# Patient Record
Sex: FEMALE | Race: WHITE | URBAN - METROPOLITAN AREA
[De-identification: names, ages, dates, MRNs, and addresses within clinical notes are randomized per-mention and may not be internally consistent; named-entity substitution may affect disease eponyms.]

---

## 2017-08-30 ENCOUNTER — TRANSFERRED RECORDS (OUTPATIENT)
Dept: HEALTH INFORMATION MANAGEMENT | Facility: CLINIC | Age: 62
End: 2017-08-30

## 2017-09-11 ENCOUNTER — TRANSFERRED RECORDS (OUTPATIENT)
Dept: HEALTH INFORMATION MANAGEMENT | Facility: CLINIC | Age: 62
End: 2017-09-11

## 2017-11-24 ENCOUNTER — PRE VISIT (OUTPATIENT)
Dept: ORTHOPEDICS | Facility: CLINIC | Age: 62
End: 2017-11-24

## 2017-11-24 DIAGNOSIS — M25.552 HIP PAIN, LEFT: Primary | ICD-10-CM

## 2017-11-24 NOTE — TELEPHONE ENCOUNTER
Discussed with patient reason for visit Left hip pain  Patient prepared for appointment through the followin. Have you had any recent xrays in the last 6 months? No, she will come early for XR.     2. Have you had an MRI? Yes - Confirmed in EPIC.    3. Have you had any surgery in past related to complaint?  No.  If yes, Patient advised that implant stickers are needed for any previous total joint surgery as well for appointment.     4. Are you being referred by another provider? Yes: Dr. David Alonso.      5. Have you received the intake form in mail?.Yes.    6. Is this work comp or MVA related? No.   Rhiannon Alejandro RN

## 2017-11-28 ENCOUNTER — RADIANT APPOINTMENT (OUTPATIENT)
Dept: GENERAL RADIOLOGY | Facility: CLINIC | Age: 62
End: 2017-11-28
Attending: ORTHOPAEDIC SURGERY
Payer: MEDICAID

## 2017-11-28 ENCOUNTER — OFFICE VISIT (OUTPATIENT)
Dept: ORTHOPEDICS | Facility: CLINIC | Age: 62
End: 2017-11-28
Payer: MEDICAID

## 2017-11-28 VITALS
SYSTOLIC BLOOD PRESSURE: 156 MMHG | HEART RATE: 102 BPM | WEIGHT: 293 LBS | DIASTOLIC BLOOD PRESSURE: 91 MMHG | BODY MASS INDEX: 47.09 KG/M2 | HEIGHT: 66 IN | TEMPERATURE: 97.2 F

## 2017-11-28 DIAGNOSIS — M25.552 HIP PAIN, LEFT: ICD-10-CM

## 2017-11-28 DIAGNOSIS — G89.29 CHRONIC LEFT-SIDED LOW BACK PAIN WITH LEFT-SIDED SCIATICA: Primary | ICD-10-CM

## 2017-11-28 DIAGNOSIS — M54.42 CHRONIC LEFT-SIDED LOW BACK PAIN WITH LEFT-SIDED SCIATICA: Primary | ICD-10-CM

## 2017-11-28 PROCEDURE — 99204 OFFICE O/P NEW MOD 45 MIN: CPT | Performed by: ORTHOPAEDIC SURGERY

## 2017-11-28 PROCEDURE — 73502 X-RAY EXAM HIP UNI 2-3 VIEWS: CPT | Performed by: RADIOLOGY

## 2017-11-28 ASSESSMENT — PAIN SCALES - GENERAL: PAINLEVEL: WORST PAIN (10)

## 2017-11-28 NOTE — LETTER
11/28/2017      RE: Lilliana Jordan  [address]  [facility] [id number]     Dear Colleague,    Thank you for referring your patient, Lilliana Jordan, to the [facility]. Please see a copy of my visit note below.    Chief Complaint: Consult (left hip pain)      Physician:  David Alonso    HPI: Lilliana Jordan is a 62 year old female who presents today for evaluation of her left hip. Symptoms are frequently radicular in nature and go below the knee. She had an intra-articular steroid injection into her hip and this did not change her symptoms including in the first few hours.She has had HENNY and reports that she has good but temporary relief from these.     Current Status:  Results of the patient s Hip Disability and Osteoarthritis Outcome Score (HOOS)  are as follows (0-100 scales with 100 being the theoretical best):  Pain: 20  Symptoms: 0  ADLs: 29  Sports/Recreation: 100  Quality of Life:0  (http://koos.nu/)  [facility] Activity Score: 2    MEDICAL HISTORY: No past medical history on file.   Reviewed on the patient's intake form.     Pertinent negatives:  Patient has no history of DVT or PE. Discussed risk factors.    Medications:     Current Outpatient Prescriptions:      acetaminophen 500 MG CAPS, Take 1-2 tablets by mouth as needed., Disp: , Rfl:      albuterol (ACCUNEB) 1.25 MG/3ML nebulizer solution, Take 1 ampule by nebulization every 6 hours as needed., Disp: , Rfl:      allopurinol (ZYLOPRIM) 100 MG tablet, Take 100 mg by mouth daily., Disp: , Rfl:      ALPRAZolam (XANAX) 0.25 MG tablet, Take 0.25 mg by mouth 3 times daily as needed., Disp: , Rfl:      bisacodyl (DULCOLAX) 10 MG suppository, Place 10 mg rectally daily as needed., Disp: , Rfl:      budesonide (PULMICORT) 1 MG/2ML SUSP nebulizer solution, Take 1 mg by nebulization., Disp: , Rfl:      Buprenorphine (BUTRANS) 20 MCG/HR PTWK, Place  onto the skin., Disp: , Rfl:      busPIRone (BUSPAR) 5 MG  tablet, Take 5 mg by mouth 2 times daily., Disp: , Rfl:      clobetasol (TEMOVATE) 0.05 % external solution, Apply  topically 2 times daily., Disp: , Rfl:      Guaifenesin-Codeine (ROBITUSSIN A-C OR), Take  by mouth., Disp: , Rfl:      escitalopram (LEXAPRO) 20 MG tablet, Take 20 mg by mouth daily., Disp: , Rfl:      Exenatide (BYETTA 10 MCG PEN) 10 MCG/0.04ML SOLN, Inject 10 mcg Subcutaneous 2 times daily (before meals)., Disp: , Rfl:      gabapentin (NEURONTIN) 600 MG tablet, Take 600 mg by mouth 3 times daily., Disp: , Rfl:      HYDROcodone-acetaminophen 5-325 MG per tablet, Take 1 tablet by mouth every 6 hours as needed., Disp: , Rfl:      NovoLOG VIAL 100 UNITS/ML SC SOLN, Inject  Subcutaneous. As directed, Disp: , Rfl:      LANTUS VIAL 100 UNITS/ML SC SOLN, Inject 40 Units Subcutaneous 2 times daily., Disp: , Rfl:      ipratropium - albuterol 0.5 mg/2.5 mg/3 mL (DUONEB) 0.5-2.5 (3) MG/3ML nebulization, Take 1 ampule by nebulization every 6 hours as needed., Disp: , Rfl:      lidocaine (LIDODERM) 5 % patch, Place 1 patch onto the skin every 24 hours., Disp: , Rfl:      metFORMIN (GLUMETZA) 1000 MG (MOD) 24 hr tablet, Take 1,000 mg by mouth 2 times daily., Disp: , Rfl:      miconazole (ZEASORB-AF) 2 % powder, Apply  topically as needed., Disp: , Rfl:      montelukast (SINGULAIR) 10 MG tablet, Take 10 mg by mouth At Bedtime., Disp: , Rfl:      omeprazole (PRILOSEC) 20 MG capsule, Take 1 capsule by mouth 2 times daily., Disp: , Rfl:      oxybutynin (DITROPAN) 5 MG tablet, Take 1 tablet by mouth 2 times daily., Disp: , Rfl:      QUEtiapine (SEROQUEL XR) 200 MG 24 hr tablet, Take 200 mg by mouth At Bedtime., Disp: , Rfl:      rizatriptan (MAXALT) 10 MG tablet, Take 1 tablet by mouth at onset of headache., Disp: , Rfl:      ROPINIROLE HYDROCHLORIDE 4 MG TABS, Take 1 tablet by mouth At Bedtime., Disp: , Rfl:      valsartan (DIOVAN) 80 MG tablet, Take 1 tablet by mouth daily., Disp: , Rfl:     Allergies: Droperidol;  "Zofran [ondansetron hcl-dextrose]; Bactrim [sulfamethoxazole w-trimethoprim]; and Clindamycin hcl    SURGICAL HISTORY: No past surgical history on file.    FAMILY HISTORY: No family history on file.    SOCIAL HISTORY:   Social History   Substance Use Topics     Smoking status: Never Smoker     Smokeless tobacco: Not on file     Alcohol use No       REVIEW OF SYSTEMS:  The comprehensive review of systems from the intake form was reviewed with the patient.  No fever, weight change or fatigue. No dry eyes. No oral ulcers, sore throat or voice change. No palpitations, syncope, angina or edema.  No chest pain, excessive sleepiness, shortness of breath or hemoptysis.   No abdominal pain, nausea, vomiting, diarrhea or heartburn.  No skin rash. No focal weakness or numbness. No bleeding or lymphadenopathy. No rhinitis or hives.     Exam:  On physical examination the patient appears the stated age, is in no acute distress, affectThe is appropriate, and breathing is non-labored.  Vitals are documented in the EMR and have been reviewed:    BP (!) 156/91 (Patient Position: Sitting, Cuff Size: Adult Large)  Pulse 102  Temp 97.2  F (36.2  C)  Ht 1.67 m (5' 5.75\")  Wt (!) 176.9 kg (390 lb)  BMI 63.43 kg/m2  5' 5.75\"  Body mass index is 63.43 kg/(m^2).    Rises from chair:easily   Gait:bilateral trendelenburg  Lateral hip symptoms with palpation at the greater troch   Distally, the circulatory, motor, and sensation exam is intact with 5/5 EHL, gastroc-soleus, and tibialis anterior.  Sensation to light touch is intact.  Dorsalis pedis and posterior tibialis pulses are palpable.  There are no sores on the feet, no bruising, and no lymphedema.    X-rays:   Normal joint space for age, symmetric hips.   Unremarkable MR findings with minimal degenerative changes.     Assessment and Plan: This is a 62 year old with symptoms, imaging, and physical examination that are not consistent with a hip problem. I have suggested that it would " be reasonable to have her back re-evaluated especially given the fact that HENNY lumbar spine have been the only intervention that has changed her symptoms.     Plan:        Again, thank you for allowing me to participate in the care of your patient.      Sincerely,    Gavin Aiken MD

## 2017-11-28 NOTE — MR AVS SNAPSHOT
After Visit Summary   2017    Lilliana Jordan    MRN: [id number]           Patient Information     Date Of Birth          1955        Visit Information        Provider Department      2017 11:30 AM Gavin Aiken MD [facility]        Today's Diagnoses     Chronic left-sided low back pain with left-sided sciatica    -  1       Follow-ups after your visit        Who to contact     If you have questions or need follow up information about today's clinic visit or your schedule please contact [facility] directly at [phone].  Normal or non-critical lab and imaging results will be communicated to you by MyChart, letter or phone within 4 business days after the clinic has received the results. If you do not hear from us within 7 days, please contact the clinic through [employer] or phone. If you have a critical or abnormal lab result, we will notify you by phone as soon as possible.  Submit refill requests through [employer] or call your pharmacy and they will forward the refill request to us. Please allow 3 business days for your refill to be completed.          Additional Information About Your Visit        MyChart Information     [employer] is an electronic gateway that provides easy, online access to your medical records. With [employer], you can request a clinic appointment, read your test results, renew a prescription or communicate with your care team.     To sign up for [employer] visit the website at [email or web address]   You will be asked to enter the access code listed below, as well as some personal information. Please follow the directions to create your username and password.     Your access code is: [email or web address]  Expires: 2018  1:27 PM     Your access code will  in 90 days. If you need help or a new code, please contact your [facility] or call [phone] for assistance.        Care  "EveryWhere ID     This is your Care EveryWhere ID. This could be used by other organizations to access your [place] medical records  [id number]        Your Vitals Were     Pulse Temperature Height BMI (Body Mass Index)          102 97.2  F (36.2  C) 1.67 m (5' 5.75\") 63.43 kg/m2         Blood Pressure from Last 3 Encounters:   11/28/17 (!) 156/91    Weight from Last 3 Encounters:   11/28/17 (!) 176.9 kg (390 lb)   07/30/12 (!) 171.5 kg (378 lb)              Today, you had the following     No orders found for display       Primary Care Provider Office Phone # Fax #    Nehemias Walsh [phone]       [facility] [address]  [facility] [address]        Equal Access to Services     JORGE LEWIS : Hadii aad ku hadasho Sodiamond, waaxda luqadaha, qaybta kaalmada adefrank, debi martin . [facility] [phone].    ATENCIÓN: Si habla español, tiene a nguyen disposición servicios gratuitos de asistencia lingüística. Monica al [phone].    We comply with applicable federal civil rights laws and [place] laws. We do not discriminate on the basis of race, color, national origin, age, disability, sex, sexual orientation, or gender identity.            Thank you!     Thank you for choosing [facility]  for your care. Our goal is always to provide you with excellent care. Hearing back from our patients is one way we can continue to improve our services. Please take a few minutes to complete the written survey that you may receive in the mail after your visit with us. Thank you!             Your Updated Medication List - Protect others around you: Learn how to safely use, store and throw away your medicines at www.disposemymeds.org.          This list is accurate as of: 11/28/17  1:27 PM.  Always use your most recent med list.                   Brand Name Dispense Instructions for use Diagnosis    acetaminophen 500 MG Caps      Take " 1-2 tablets by mouth as needed.        albuterol 1.25 MG/3ML nebulizer solution    ACCUNEB     Take 1 ampule by nebulization every 6 hours as needed.        allopurinol 100 MG tablet    ZYLOPRIM     Take 100 mg by mouth daily.        BUSPAR 5 MG tablet   Generic drug:  busPIRone      Take 5 mg by mouth 2 times daily.        BUTRANS 20 MCG/HR WK patch   Generic drug:  buprenorphine      Place  onto the skin.        BYETTA 10 MCG PEN 10 MCG/0.04ML Soln pen (contains 2.4 ml = 60 doses)   Generic drug:  exenatide      Inject 10 mcg Subcutaneous 2 times daily (before meals).        DIOVAN 80 MG tablet   Generic drug:  valsartan      Take 1 tablet by mouth daily.        DULCOLAX 10 MG Suppository   Generic drug:  bisacodyl      Place 10 mg rectally daily as needed.        gabapentin 600 MG tablet    NEURONTIN     Take 600 mg by mouth 3 times daily.        HYDROcodone-acetaminophen 5-325 MG per tablet    NORCO     Take 1 tablet by mouth every 6 hours as needed.        ipratropium - albuterol 0.5 mg/2.5 mg/3 mL 0.5-2.5 (3) MG/3ML neb solution    DUONEB     Take 1 ampule by nebulization every 6 hours as needed.        LANTUS VIAL 100 UNIT/ML injection   Generic drug:  insulin glargine      Inject 40 Units Subcutaneous 2 times daily.        LEXAPRO 20 MG tablet   Generic drug:  escitalopram      Take 20 mg by mouth daily.        LIDODERM 5 % Patch   Generic drug:  lidocaine      Place 1 patch onto the skin every 24 hours.        MAXALT 10 MG tablet   Generic drug:  rizatriptan      Take 1 tablet by mouth at onset of headache.        metFORMIN modified 1000 MG 24 hr tablet    GLUMETZA     Take 1,000 mg by mouth 2 times daily.        NovoLOG VIAL 100 UNITS/ML injection   Generic drug:  insulin aspart      Inject  Subcutaneous. As directed        oxybutynin 5 MG tablet    DITROPAN     Take 1 tablet by mouth 2 times daily.        priLOSEC 20 MG CR capsule   Generic drug:  omeprazole      Take 1 capsule by mouth 2 times daily.         PULMICORT 1 MG/2ML Susp neb solution   Generic drug:  budesonide      Take 1 mg by nebulization.        ROBITUSSIN A-C OR      Take  by mouth.        ROPINIROLE HYDROCHLORIDE 4 MG Tabs      Take 1 tablet by mouth At Bedtime.        SEROQUEL  MG 24 hr tablet   Generic drug:  QUEtiapine      Take 200 mg by mouth At Bedtime.        SINGULAIR 10 MG tablet   Generic drug:  montelukast      Take 10 mg by mouth At Bedtime.        TEMOVATE 0.05 % external solution   Generic drug:  clobetasol      Apply  topically 2 times daily.        XANAX 0.25 MG tablet   Generic drug:  ALPRAZolam      Take 0.25 mg by mouth 3 times daily as needed.        ZEASORB-AF 2 % powder   Generic drug:  miconazole      Apply  topically as needed.

## 2017-11-28 NOTE — PROGRESS NOTES
Chief Complaint: Consult (left hip pain)      Physician:  David Alonso    HPI: Lilliana Jordan is a 62 year old female who presents today for evaluation of her left hip. Symptoms are frequently radicular in nature and go below the knee. She had an intra-articular steroid injection into her hip and this did not change her symptoms including in the first few hours.She has had HENNY and reports that she has good but temporary relief from these.     Current Status:  Results of the patient s Hip Disability and Osteoarthritis Outcome Score (HOOS)  are as follows (0-100 scales with 100 being the theoretical best):  Pain: 20  Symptoms: 0  ADLs: 29  Sports/Recreation: 100  Quality of Life:0  (http://koos.nu/)  [facility] Activity Score: 2    MEDICAL HISTORY: No past medical history on file.   Reviewed on the patient's intake form.     Pertinent negatives:  Patient has no history of DVT or PE. Discussed risk factors.    Medications:     Current Outpatient Prescriptions:      acetaminophen 500 MG CAPS, Take 1-2 tablets by mouth as needed., Disp: , Rfl:      albuterol (ACCUNEB) 1.25 MG/3ML nebulizer solution, Take 1 ampule by nebulization every 6 hours as needed., Disp: , Rfl:      allopurinol (ZYLOPRIM) 100 MG tablet, Take 100 mg by mouth daily., Disp: , Rfl:      ALPRAZolam (XANAX) 0.25 MG tablet, Take 0.25 mg by mouth 3 times daily as needed., Disp: , Rfl:      bisacodyl (DULCOLAX) 10 MG suppository, Place 10 mg rectally daily as needed., Disp: , Rfl:      budesonide (PULMICORT) 1 MG/2ML SUSP nebulizer solution, Take 1 mg by nebulization., Disp: , Rfl:      Buprenorphine (BUTRANS) 20 MCG/HR PTWK, Place  onto the skin., Disp: , Rfl:      busPIRone (BUSPAR) 5 MG tablet, Take 5 mg by mouth 2 times daily., Disp: , Rfl:      clobetasol (TEMOVATE) 0.05 % external solution, Apply  topically 2 times daily., Disp: , Rfl:      Guaifenesin-Codeine (ROBITUSSIN A-C OR), Take  by mouth., Disp: , Rfl:      escitalopram (LEXAPRO) 20 MG  tablet, Take 20 mg by mouth daily., Disp: , Rfl:      Exenatide (BYETTA 10 MCG PEN) 10 MCG/0.04ML SOLN, Inject 10 mcg Subcutaneous 2 times daily (before meals)., Disp: , Rfl:      gabapentin (NEURONTIN) 600 MG tablet, Take 600 mg by mouth 3 times daily., Disp: , Rfl:      HYDROcodone-acetaminophen 5-325 MG per tablet, Take 1 tablet by mouth every 6 hours as needed., Disp: , Rfl:      NovoLOG VIAL 100 UNITS/ML SC SOLN, Inject  Subcutaneous. As directed, Disp: , Rfl:      LANTUS VIAL 100 UNITS/ML SC SOLN, Inject 40 Units Subcutaneous 2 times daily., Disp: , Rfl:      ipratropium - albuterol 0.5 mg/2.5 mg/3 mL (DUONEB) 0.5-2.5 (3) MG/3ML nebulization, Take 1 ampule by nebulization every 6 hours as needed., Disp: , Rfl:      lidocaine (LIDODERM) 5 % patch, Place 1 patch onto the skin every 24 hours., Disp: , Rfl:      metFORMIN (GLUMETZA) 1000 MG (MOD) 24 hr tablet, Take 1,000 mg by mouth 2 times daily., Disp: , Rfl:      miconazole (ZEASORB-AF) 2 % powder, Apply  topically as needed., Disp: , Rfl:      montelukast (SINGULAIR) 10 MG tablet, Take 10 mg by mouth At Bedtime., Disp: , Rfl:      omeprazole (PRILOSEC) 20 MG capsule, Take 1 capsule by mouth 2 times daily., Disp: , Rfl:      oxybutynin (DITROPAN) 5 MG tablet, Take 1 tablet by mouth 2 times daily., Disp: , Rfl:      QUEtiapine (SEROQUEL XR) 200 MG 24 hr tablet, Take 200 mg by mouth At Bedtime., Disp: , Rfl:      rizatriptan (MAXALT) 10 MG tablet, Take 1 tablet by mouth at onset of headache., Disp: , Rfl:      ROPINIROLE HYDROCHLORIDE 4 MG TABS, Take 1 tablet by mouth At Bedtime., Disp: , Rfl:      valsartan (DIOVAN) 80 MG tablet, Take 1 tablet by mouth daily., Disp: , Rfl:     Allergies: Droperidol; Zofran [ondansetron hcl-dextrose]; Bactrim [sulfamethoxazole w-trimethoprim]; and Clindamycin hcl    SURGICAL HISTORY: No past surgical history on file.    FAMILY HISTORY: No family history on file.    SOCIAL HISTORY:   Social History   Substance Use Topics      "Smoking status: Never Smoker     Smokeless tobacco: Not on file     Alcohol use No       REVIEW OF SYSTEMS:  The comprehensive review of systems from the intake form was reviewed with the patient.  No fever, weight change or fatigue. No dry eyes. No oral ulcers, sore throat or voice change. No palpitations, syncope, angina or edema.  No chest pain, excessive sleepiness, shortness of breath or hemoptysis.   No abdominal pain, nausea, vomiting, diarrhea or heartburn.  No skin rash. No focal weakness or numbness. No bleeding or lymphadenopathy. No rhinitis or hives.     Exam:  On physical examination the patient appears the stated age, is in no acute distress, affectThe is appropriate, and breathing is non-labored.  Vitals are documented in the EMR and have been reviewed:    BP (!) 156/91 (Patient Position: Sitting, Cuff Size: Adult Large)  Pulse 102  Temp 97.2  F (36.2  C)  Ht 1.67 m (5' 5.75\")  Wt (!) 176.9 kg (390 lb)  BMI 63.43 kg/m2  5' 5.75\"  Body mass index is 63.43 kg/(m^2).    Rises from chair:easily   Gait:bilateral trendelenburg  Lateral hip symptoms with palpation at the greater troch   Distally, the circulatory, motor, and sensation exam is intact with 5/5 EHL, gastroc-soleus, and tibialis anterior.  Sensation to light touch is intact.  Dorsalis pedis and posterior tibialis pulses are palpable.  There are no sores on the feet, no bruising, and no lymphedema.    X-rays:   Normal joint space for age, symmetric hips.   Unremarkable MR findings with minimal degenerative changes.     Assessment and Plan: This is a 62 year old with symptoms, imaging, and physical examination that are not consistent with a hip problem. I have suggested that it would be reasonable to have her back re-evaluated especially given the fact that HENNY lumbar spine have been the only intervention that has changed her symptoms.     Plan:      "

## 2017-11-28 NOTE — NURSING NOTE
"Lilliana Jordan's goals for this visit include:   She requests these members of her care team be copied on today's visit information:    PCP: Nehemias Walsh    Referring Provider:  David Alonso  [facility]  [address] [place] [address]    Chief Complaint   Patient presents with     Consult     left hip pain       Initial BP (!) 156/91 (Patient Position: Sitting, Cuff Size: Adult Large)  Pulse 102  Temp 97.2  F (36.2  C)  Ht 1.67 m (5' 5.75\")  Wt (!) 176.9 kg (390 lb)  BMI 63.43 kg/m2 Estimated body mass index is 63.43 kg/(m^2) as calculated from the following:    Height as of this encounter: 1.67 m (5' 5.75\").    Weight as of this encounter: 176.9 kg (390 lb).  Medication Reconciliation: complete    Do you need any medication refills at today's visit? No  Chief Complaint   Patient presents with     Consult     left hip pain         "